# Patient Record
Sex: MALE | Race: WHITE
[De-identification: names, ages, dates, MRNs, and addresses within clinical notes are randomized per-mention and may not be internally consistent; named-entity substitution may affect disease eponyms.]

---

## 2018-03-12 ENCOUNTER — HOSPITAL ENCOUNTER (INPATIENT)
Dept: HOSPITAL 80 - FED | Age: 48
LOS: 3 days | Discharge: PSYCHIATRIC OR SUBSTANCE USE FACILITY | DRG: 885 | End: 2018-03-15
Attending: INTERNAL MEDICINE | Admitting: FAMILY MEDICINE
Payer: MEDICAID

## 2018-03-12 DIAGNOSIS — F43.10: ICD-10-CM

## 2018-03-12 DIAGNOSIS — F12.90: ICD-10-CM

## 2018-03-12 DIAGNOSIS — Z87.891: ICD-10-CM

## 2018-03-12 DIAGNOSIS — F25.0: ICD-10-CM

## 2018-03-12 DIAGNOSIS — M62.82: ICD-10-CM

## 2018-03-12 DIAGNOSIS — F23: Primary | ICD-10-CM

## 2018-03-12 LAB
CK SERPL-CCNC: (no result) IU/L (ref 0–224)
CK SERPL-CCNC: (no result) IU/L (ref 0–224)
CK SERPL-CCNC: 9970 IU/L (ref 0–224)
PLATELET # BLD: 245 10^3/UL (ref 150–400)

## 2018-03-12 PROCEDURE — G0378 HOSPITAL OBSERVATION PER HR: HCPCS

## 2018-03-12 PROCEDURE — G0480 DRUG TEST DEF 1-7 CLASSES: HCPCS

## 2018-03-12 NOTE — PDGENHP
History and Physical





- Chief Complaint


agitation, rhabdomyolysis





- History of Present Illness








Source-Patient is somnolent and sedated.  History limited to discussion with ED 

provider and review of EMR.





HPI  - this is a 47-year-old gentleman with past medical history significant 

for bipolar disorder, schizophrenia who presents emergency department with 

focal PD for severe agitation.  History is quite limited details are slim.  

Patient required chemical and physical restraints.  He has received Ativan and 

Zyprexa and has been sedated.  Patient's laboratory studies were significant 

for elevated CK of greater than 10,000.  Patient has received IV fluids and 

despite this his CK remains quite elevated.  His renal function is intact.  

Patient has been put on a M1 hold and is not quite medically cleared for 

discharge to inpatient psychiatric facility.











History Information





- Allergies/Home Medication List


Allergies/Adverse Reactions: 








haloperidol [From Haldol] Allergy (Verified 10/18/13 15:38)


 


Clozaril Allergy (Uncoded 10/18/13 16:59)


 





Home Medications: 








Divalproex ER [Depakote  MG (RX)] 1,500 mg PO DAILY16 10/17/13 [Last 

Taken Unknown]


OLANZapine DISINTEGR [ZyPREXA ZYDIS 10 mg (RX)] 25 mg PO DAILY16 10/17/13 [Last 

Taken Unknown]





I have personally reviewed and updated: family history, medical history, social 

history, surgical history





- Past Medical History


Additional medical history: Schizophrenia, bipolar disorder





- Surgical History


Additional surgical history: Unable to obtain as patient is sedated.  None 

listed in EMR.





- Family History


Additional family history: Unable to obtain secondary to patient's sedation.





- Social History


Smoking Status: Former smoker


Drug Use: Marijuana


Additional social history: Patient's U tox positive for marijuana.





Review of Systems


Review of Systems: 


Unable to obtain secondary to patient's sedation somnolence.





Physical Exam


Physical Exam: 

















Temp Pulse Resp BP Pulse Ox


 


 36.7 C   108 H  20   114/60   95 


 


 03/12/18 16:00  03/12/18 22:27  03/12/18 22:27  03/12/18 22:27  03/12/18 22:27








 Selected Entries











  03/12/18





  13:21


 


Blood Pressure Automatic





Method 


 


Heart Rate 107 H


 


Respiratory 34 H





Rate 


 


O2 Sat (%) 99


 


Temperature (C) 37.6 C


 


Blood Pressure 118/82 H


 


Mean Arterial 94





Pressure (MAP) 


 


Temperature Oral





Source 











Constitutional: no apparent distress, unkempt, other (Somnolent and sedated.  

Patient does open his eyes and says yes to just a few questions before falling 

asleep again.)


Eyes: PERRL (Pupils equal round decreased reactivity light bilaterally and 

symmetric.), anicteric sclera, No EOMI (Unable to assess.  Patient with 

voluntary lateral gaze intact.), No scleral injection


Ears, Nose, Mouth, Throat: no oral mucosal ulcers, dry mucous membranes, No 

poor dentition


Cardiovascular: pulses symmetric bilaterally, tachycardia, No systolic murmur


Peripheral Pulses: 2+: dorsalis-pedis (R), dorsalis-pedis (L)


Respiratory: no respiratory distress, no rales or rhonchi, clear to auscultation

, No expiratory wheeze


Gastrointestinal: normoactive bowel sounds, soft, non-tender abdomen, no 

palpable masses, No huerta's sign, No guarding, No distension


Genitourinary: no bladder tenderness, No pierre in urethra


Skin: warm, normal color, no rashes or abrasions


Musculoskeletal: other (Limited exam.  Patient is able to open his eyes and 

turn his head but does not move his extremities as he falls back asleep.)


Neurologic: other (No facial drooping.  Patient again is somnolent and sedated.)


Psychiatric: other (Limited exam patient is somnolent and sedated.), No agitated





Lab Data & Imaging Review





 03/12/18 13:31





 03/12/18 21:45














WBC  15.40 10^3/uL (3.80-9.50)  H  03/12/18  13:31    


 


RBC  5.00 10^6/uL (4.40-6.38)   03/12/18  13:31    


 


Hgb  15.5 g/dL (13.7-17.5)   03/12/18  13:31    


 


Hct  43.5 % (40.0-51.0)   03/12/18  13:31    


 


MCV  87.0 fL (81.5-99.8)   03/12/18  13:31    


 


MCH  31.0 pg (27.9-34.1)   03/12/18  13:31    


 


MCHC  35.6 g/dL (32.4-36.7)   03/12/18  13:31    


 


RDW  12.5 % (11.5-15.2)   03/12/18  13:31    


 


Plt Count  245 10^3/uL (150-400)   03/12/18  13:31    


 


MPV  9.7 fL (8.7-11.7)   03/12/18  13:31    


 


Neut % (Auto)  90.6 % (39.3-74.2)  H  03/12/18  13:31    


 


Lymph % (Auto)  5.0 % (15.0-45.0)  L  03/12/18  13:31    


 


Mono % (Auto)  3.7 % (4.5-13.0)  L  03/12/18  13:31    


 


Eos % (Auto)  0.0 % (0.6-7.6)  L  03/12/18  13:31    


 


Baso % (Auto)  0.2 % (0.3-1.7)  L  03/12/18  13:31    


 


Nucleat RBC Rel Count  0.0 % (0.0-0.2)   03/12/18  13:31    


 


Absolute Neuts (auto)  13.95 10^3/uL (1.70-6.50)  H  03/12/18  13:31    


 


Absolute Lymphs (auto)  0.77 10^3/uL (1.00-3.00)  L  03/12/18  13:31    


 


Absolute Monos (auto)  0.57 10^3/uL (0.30-0.80)   03/12/18  13:31    


 


Absolute Eos (auto)  0.00 10^3/uL (0.03-0.40)  L  03/12/18  13:31    


 


Absolute Basos (auto)  0.03 10^3/uL (0.02-0.10)   03/12/18  13:31    


 


Absolute Nucleated RBC  0.00 10^3/uL (0-0.01)   03/12/18  13:31    


 


Immature Gran %  0.5 % (0.0-1.1)   03/12/18  13:31    


 


Immature Gran #  0.08 10^3/uL (0.00-0.10)   03/12/18  13:31    


 


Puncture Site  RIGHT RADIAL   03/12/18  14:20    


 


Patient Temperature  37.0 DEGREES  03/12/18  14:20    


 


pCO2  24 mmHg (34-38)  L  03/12/18  14:20    


 


pO2  91 mmHg (65-75)  H  03/12/18  14:20    


 


Total CO2  12 mEq/L (23-27)  L  03/12/18  14:20    


 


ABG pH  7.29  (7.35-7.45)  L  03/12/18  14:20    


 


ABG HCO3  11 mEq/L (22-26)  L  03/12/18  14:20    


 


ABG O2 Saturation  95 % (92-95)   03/12/18  14:20    


 


ABG Base Excess  -13.8 mEq/L (-2.5-2.5)  L  03/12/18  14:20    


 


Sodium  139 mEq/L (135-145)   03/12/18  13:31    


 


Potassium  4.0 mEq/L (3.5-5.2)   03/12/18  13:31    


 


Chloride  102 mEq/L ()   03/12/18  13:31    


 


Carbon Dioxide  8 mEq/l (22-31)  L*  03/12/18  13:31    


 


Anion Gap  29 mEq/L (8-16)  H  03/12/18  13:31    


 


BUN  29 mg/dL (7-23)  H  03/12/18  13:31    


 


Creatinine  1.0 mg/dL (0.7-1.3)   03/12/18  13:31    


 


Estimated GFR  > 60   03/12/18  13:31    


 


Glucose  88 mg/dL ()   03/12/18  13:31    


 


Calcium  10.0 mg/dL (8.5-10.4)   03/12/18  13:31    


 


Creatine Kinase  48945 IU/L (0-224)  H  03/12/18  21:45    


 


CK-MB (CK-2) Fraction  36.40 ng/mL (0.00-3.19)  H  03/12/18  18:28    


 


CK-MB (CK-2) %  0.4 % (0.0-4.0)   03/12/18  18:28    


 


Creatine Kinase Interp  NEGATIVE  (NEGATIVE)   03/12/18  18:28    


 


Urine Color  YELLOW   03/12/18  20:25    


 


Urine Appearance  CLEAR   03/12/18  20:25    


 


Urine pH  5.0  (5.0-7.5)   03/12/18  20:25    


 


Ur Specific Gravity  1.026  (1.002-1.030)   03/12/18  20:25    


 


Urine Protein  1+  (NEGATIVE)  H  03/12/18  20:25    


 


Urine Ketones  2+  (NEGATIVE)  H  03/12/18  20:25    


 


Urine Blood  2+  (NEGATIVE)  H  03/12/18  20:25    


 


Urine Nitrate  NEGATIVE  (NEGATIVE)   03/12/18  20:25    


 


Urine Bilirubin  NEGATIVE  (NEGATIVE)   03/12/18  20:25    


 


Urine Urobilinogen  NEGATIVE EU (0.2-1.0)   03/12/18  20:25    


 


Ur Leukocyte Esterase  NEGATIVE  (NEGATIVE)   03/12/18  20:25    


 


Urine RBC  1-3 /hpf (0-3)   03/12/18  20:25    


 


Urine WBC  1-3 /hpf (0-3)   03/12/18  20:25    


 


Ur Epithelial Cells  TRACE /lpf (NONE-1+)   03/12/18  20:25    


 


Hyaline Casts  1-5 /lpf (0-1)   03/12/18  20:25    


 


Urine Mucus  TRACE /lpf (NONE-1+)   03/12/18  20:25    


 


Urine Glucose  1+  (NEGATIVE)  H  03/12/18  20:25    


 


Urine Opiates Screen  NEGATIVE  (NEGATIVE)   03/12/18  20:25    


 


Urine Barbiturates  NEGATIVE  (NEGATIVE)   03/12/18  20:25    


 


Ur Phencyclidine Scrn  NEGATIVE  (NEGATIVE)   03/12/18  20:25    


 


Ur Amphetamine Screen  NEGATIVE  (NEGATIVE)   03/12/18  20:25    


 


U Benzodiazepines Scrn  NEGATIVE  (NEGATIVE)   03/12/18  20:25    


 


Urine Cocaine Screen  NEGATIVE  (NEGATIVE)   03/12/18  20:25    


 


U Marijuana (THC) Screen  NON-NEGATIVE  (NEGATIVE)  H  03/12/18  20:25    


 


Ethyl Alcohol  < 10 mg/dL (0-10)   03/12/18  18:28    








Imaging Review: 








CT of the Head (Without Contrast) 


 


 Indication:    Altered mental status 


 


 Comparison:  None available 


 


 Technique:    Standard noncontrast head CT protocol utilizing 5 mm thick 

collimated slices and 


field of view 23 cm. Dose reduction techniques were utilized. 


 


 Findings:    No intracranial hemorrhage, mass, ischemia, swelling, or 

extraaxial fluid collection. 


 


 Incidentally noted prominent retrocerebellar cerebrospinal fluid with normal-

appearing fourth 


ventricle and normal are followed to the cerebellar hemispheres. 


 


 The ventricles are normal caliber and midline. The gray-white matter there is 

leftward septal 


deviation. Has normal attenuation. The bones are unremarkable. The paranasal 

sinuses are clear. The 


right frontal sinus is not pneumatized. Remote nasal bone fracture noted. 


 


 


Impression: 


1. No evidence of acute intracranial abnormality. 


2. Incidental note of yuriy-cisterna magna. Differential consideration of 

arachnoid cyst is less 


likely given symmetry. 


 


Findings and recommendations discussed with Moshe Millard  at 5:09 PM hour, 3

/12/2018. 


EKG additional interpertation: Sinus tachycardia in the 1 100s.  No acute ST 

changes.  Some slight people changes with likely related rate.  Q-wave in lead 

3 only.  .





Assessment & Plan


Assessment: 








Acute psychosis (Acute) - with history of bipolar disorder and schizophrenia.  

Zyprexa will be made available p.r.n..  If the patient should wake and is 

cooperative will plan to resume his home Depakote and Zyprexa.





Rhabdomyolysis (Acute) - cause for patient's rhabdo is unclear at this time.  

He is not able answer any questions or provide history regarding any injuries 

or falls.  There is no evidence of trauma on exam.  Will continue with IV fluid 

hydration and plan to repeat CKs.





Leukocytosis - likely reactive in setting of significant dehydration and 

psychosis.  Continue with IV fluid hydration patient is afebrile.  Repeat CBC 

in the morning.


Metabolic AG acidosis - improved after IV fluids.  Continue monitor a.m. BMP.





FEN - IV fluids continuous as tolerated.  Encourage oral hydration once patient'

s sedation is improved.  Diet advanced also on patient's mentation improved.


PPX-SCDs.  Lovenox if patient should stay additional day otherwise encourage 

mobilization.  Anticipate short hospital stay.


Cor-by default will remain full at this time.  Patient with acute psychosis and 

able answer.


Disposition-patient be admitted to ICU for M1 hold and close monitoring to 

observation status.  If patient's CK improves by a.m. The plan to transfer to 

an inpatient psychiatric facility.

## 2018-03-12 NOTE — EDPHY
H & P


Stated Complaint: pt presents with psychiatic complaint, confusion and poss 

high BS


Source: Patient


Exam Limitations: No limitations





- Personal History


Current Tetanus Diphtheria and Acellular Pertussis (TDAP): Unsure





- Medical/Surgical History


Hx Asthma: No


Hx Chronic Respiratory Disease: No


Hx Diabetes: No


Hx Cardiac Disease: No


Hx Renal Disease: No


Hx Cirrhosis: No


Hx Alcoholism: No


Hx HIV/AIDS: No


Hx Splenectomy or Spleen Trauma: No


Other PMH: bipoar, schizophrenia, PTSD





- Social History


Smoking Status: Former smoker


Time Seen by Provider: 03/12/18 13:33


HPI/ROS: 





CHIEF COMPLAINT:  Altered mental status, psychotic





HISTORY OF PRESENT ILLNESS:  The patient reportedly has a history of bipolar 

mood disorder and schizophrenia.  He is brought in by police and EMS with 

psychosis and agitation.  The patient is unable to provide any coherent history 

in the emergency department.  The patient was brought into resuscitation room 

where he required physical and chemical restraint.  In reviewing his prior 

records he has been hospitalized for inpatient psychiatric care.  He has 

presented with acute psychosis in the past.





REVIEW OF SYSTEMS:


A comprehensive 10 point review of systems is unobtainable secondary to altered 

mental status


 (Moshe Millard)





- Physical Exam


Exam: 





General Appearance:  Alert, agitated, combative


Eyes:  Pupils equal and round no pallor or injection


ENT, Mouth:  Mucous membranes moist


Respiratory:  There are no retractions, lungs are clear to auscultation


Cardiovascular:  Tachycardic


Gastrointestinal:  Abdomen is soft and nontender, no masses, bowel sounds normal


Neurological:  Grossly normal motor exam


Skin:  Warm and dry, no rashes


Musculoskeletal:  Neck is supple nontender


Extremities:  symmetrical, full range of motion


Psychiatric:  Psychotic, screaming, agitated (Moshe Millard)


Constitutional: 


 Initial Vital Signs











Temperature (C)  37.6 C   03/12/18 13:21


 


Heart Rate  107 H  03/12/18 13:21


 


Respiratory Rate  34 H  03/12/18 13:21


 


Blood Pressure  118/82 H  03/12/18 13:21


 


O2 Sat (%)  99   03/12/18 13:21








 











O2 Delivery Mode               Room Air














Allergies/Adverse Reactions: 


 





clozapine [From Clozaril] Allergy (Verified 03/13/18 11:36)


 


haloperidol [From Haldol] Allergy (Verified 10/18/13 15:38)


 








Home Medications: 














 Medication  Instructions  Recorded


 


Divalproex ER [Depakote  MG 1,500 mg PO DAILY16 10/17/13





(RX)]  


 


OLANZapine DISINTEGR [ZyPREXA 30 mg PO DAILY16 10/17/13





ZYDIS 10 mg (RX)]  














Medical Decision Making





- Diagnostics


EKG Interpretation: 





EKG:  Complete interpretation has been separately recorded in the [facility] 

archive.  Summary impression:  Sinus tachycardia, rate 103 (Moshe Millard)


ED Course/Re-evaluation: 





The patient required physical straits.  He received 10 mg of Zyprexa and 1 mg 

of Ativan for sedation.





The patient was placed on a cardiac monitor.  The patient was noted to have 

evidence of rhabdomyolysis with an elevated CPK.  Given his altered mental 

status and rhabdomyolysis he was taken for a CT scan of the head which 

demonstrates no evidence of an intracranial hemorrhage.  The patient had an IV 

established.  He received several L of normal saline.





I re-evaluated the patient at 6:00 p.m..  He is now alert and much more 

conversant.  He is no longer agitated and is asking for food.





A repeat CPK was ordered is found to be decreasing.  The patient's 

rhabdomyolysis has been medically cleared.





The patient is currently on M1 psychiatric hold secondary to his presenting 

psychosis.





We are still awaiting a urine toxicology at 8:00 p.m..





The patient will be turned over to Dr. Santana at shift change.





 (Moshe Millard)





9pm: This pt presented with extreme agitation requiring physical and chemical 

restraints.  He presented in rhabdomyolysis, received 2l IV NS and now has a 

decreasing CK.  Urine tox screen is positive for THC only.  He is currently 

calm and cooperative.  He complains of pain all over, but is unable to tell me 

where he has pain.  Given that his last CK was still over 9000, I will repeat a 

CK and give him another L of fluid.  If he continues to have a decreasing CK, 

he will be medically cleared for mental health evaluation.





1030pm: repeat CK is increasing.  Will continue IVF and admit for 

rhabdomyolysis.  He will need to go to the ICU because he is on an M1 hold.  I 

considered critical care time on this pt, but I only spent 20 minutes in caring 

for him.  The hospitalist service was consulted for admission.


 (Julee Santana)


Differential Diagnosis: 





Differential diagnosis considered includes intracranial hemorrhage, skull 

fracture, metabolic derangement, postictal state, rhabdomyolysis, dehydration, 

medication side effect, psychosis





 (Moshe Millard)





- Data Points


Laboratory Results: 


 Laboratory Results





 03/13/18 05:00 





 03/13/18 05:00 








Medications Given: 


 





Divalproex Sodium (Depakote Er)  1,500 mg PO DAILY16 JOSELYN


   Stop: 09/09/18 15:59


   Last Admin: 03/13/18 16:23 Dose:  1,500 mg


Sodium Chloride (Ns)  1,000 mls @ 150 mls/hr IV CONT JOSELYN


   Stop: 09/08/18 22:44


   Last Admin: 03/14/18 05:22 Dose:  1,000 mls


Lorazepam (Ativan Injection)  0.5 - 1 mg IVP Q8HRS PRN


   PRN Reason: Anxiety, Unable to Take PO


   Stop: 09/08/18 22:42


   Last Admin: 03/13/18 02:33 Dose:  1 mg


Olanzapine (Zyprexa Zydis)  30 mg PO DAILY16 JOSELYN


   Stop: 09/09/18 15:59


   Last Admin: 03/13/18 16:23 Dose:  30 mg





Discontinued Medications





Enoxaparin Sodium (Lovenox)  40 mg SC DAILY JOSELYN


   Stop: 09/09/18 08:59


   Last Admin: 03/13/18 10:35 Dose:  Not Given


Sodium Chloride (Ns)  1,000 mls @ 0 mls/hr IV EDNOW ONE; Wide Open


   PRN Reason: Protocol


   Stop: 03/12/18 16:36


   Last Admin: 03/12/18 17:05 Dose:  1,000 mls


Sodium Chloride (Ns)  1,000 mls @ 0 mls/hr IV EDNOW ONE; Wide Open


   PRN Reason: Protocol


   Stop: 03/12/18 16:36


   Last Admin: 03/12/18 17:05 Dose:  1,000 mls


Sodium Chloride (Ns)  1,000 mls @ 0 mls/hr IV ONCE ONE; Wide Open


   PRN Reason: Protocol


   Stop: 03/12/18 21:36


   Last Admin: 03/12/18 21:43 Dose:  1,000 mls


Sodium Chloride (Ns)  1,000 mls @ 200 mls/hr IV ONCE ONE


   PRN Reason: Protocol


   Stop: 03/13/18 03:36


   Last Admin: 03/12/18 22:50 Dose:  1,000 mls


Lorazepam (Ativan Injection)  1 mg IVP EDNOW ONE


   Stop: 03/12/18 13:36


   Last Admin: 03/12/18 13:40 Dose:  1 mg


Olanzapine (Zyprexa Im Injection)  10 mg IV EDNOW ONE


   Stop: 03/12/18 13:39


   Last Admin: 03/12/18 13:40 Dose:  5 mg








Departure





- Departure


Disposition: [facility]


Clinical Impression: 


 Acute psychosis





Rhabdomyolysis


Qualifiers:


 Rhabdomyolysis type: non-traumatic Qualified Code(s): M62.82 - Rhabdomyolysis





Condition: Fair

## 2018-03-13 LAB
CK SERPL-CCNC: 8842 IU/L (ref 0–224)
PLATELET # BLD: 168 10^3/UL (ref 150–400)

## 2018-03-13 RX ADMIN — OLANZAPINE SCH MG: 10 TABLET, ORALLY DISINTEGRATING ORAL at 16:23

## 2018-03-13 RX ADMIN — DIVALPROEX SODIUM SCH MG: 500 TABLET, EXTENDED RELEASE ORAL at 16:23

## 2018-03-13 RX ADMIN — SODIUM CHLORIDE SCH MLS: 900 INJECTION, SOLUTION INTRAVENOUS at 16:31

## 2018-03-13 RX ADMIN — SODIUM CHLORIDE SCH MLS: 900 INJECTION, SOLUTION INTRAVENOUS at 10:29

## 2018-03-13 RX ADMIN — SODIUM CHLORIDE SCH MLS: 900 INJECTION, SOLUTION INTRAVENOUS at 22:39

## 2018-03-13 RX ADMIN — SODIUM CHLORIDE SCH MLS: 900 INJECTION, SOLUTION INTRAVENOUS at 04:15

## 2018-03-13 NOTE — HOSPPROG
Hospitalist Progress Note


Assessment/Plan: 





#Acute psychosis: has been off psych meds. Negative CTH. No signs infection. M1 

hold. TLC eval once medically clear





#Acute rhabdo: making urine, normal renal function. Cont aggressive IVFs





#THC use: + utox





#Leukocytosis: stress inflammation, dehydration. Nearly resolved. Negative UA, 

CXR





#Diet: regular





#DVT: low-risk, ambulator





#Disp: requires ICU admission for M1 hold, aggressive IVFs


Subjective: "I am dying. Everything is killing me"


Objective: 


 Vital Signs











Temp Pulse Resp BP Pulse Ox


 


 36.7 C   90   18   121/60 H  96 


 


 03/12/18 16:00  03/13/18 07:22  03/13/18 07:22  03/12/18 23:40  03/13/18 07:22








 Laboratory Results





 03/13/18 05:00 





 03/13/18 05:00 





 











 03/12/18 03/13/18 03/14/18





 05:59 05:59 05:59


 


Intake Total  4230 


 


Output Total  0 


 


Balance  4230 














- Physical Exam


Constitutional: no apparent distress


Eyes: PERRL


Ears, Nose, Mouth, Throat: moist mucous membranes


Cardiovascular: regular rate and rhythym


Respiratory: no respiratory distress


Gastrointestinal: normoactive bowel sounds


Genitourinary: no bladder fullness


Skin: warm, other (some scabbed over wounds left hand)


Musculoskeletal: full muscle strength


Neurologic: AAOx3, CN II-XII Intact


Psychiatric: anxious, depressed





ICD10 Worksheet


Patient Problems: 


 Problems











Problem Status Onset


 


Acute psychosis Acute  


 


Rhabdomyolysis Acute  


 


Bipolar 1 disorder Acute  


 


Bipolar affective disorder, current episode manic with psychotic symptoms Acute

  


 


Bipolar affective disorder, current episode manic without psychotic symptoms 

Acute

## 2018-03-13 NOTE — ASMTLACE
LACE

 

Acuity / Level of             Answers:  No                                    

Care: Did the patient                                                         

have an inpatient                                                             

admission?                                                                    

Comorbidities - select        Answers:  Other                         Notes:  Psychotic break, Rhabdo


all that apply                                                                

# of Emergency department     Answers:  1-2                                   

visits in the last 6                                                          

months                                                                        

Social determinants           Answers:  History of substance                  

                                        abuse (ETOH, street                   

                                        drugs, prescription                   

                                        drugs, etc.)                          

                                        Homelessness                          

                                        (street, shelter)                     

                                        Mental health diagnosis               

                                        (anxiety, depression, pers            

                                        onality disorders, etc.)              

                                        Lack of community                     

                                        resources and/or lack of              

                                        social support (no                    

                                        pcp, lives                            

                                        alone, transportation, [place]            

                                        d)                                    

Score: 15

 

Date Signed:  03/13/2018 09:38 AM

Electronically Signed By:Aaliyah Pastor LCSW

## 2018-03-13 NOTE — ASMTCMCOM
CM Note

 

CM Note                       

Notes:

47yr old male admitted for Acute psychosis, Bipolar, Schizophrenia, Rhabdo. Patient is homeless and 


on an M1 Hold. He will be assessed by [facility] when medically stable. CM to follow.

 

Date Signed:  03/13/2018 09:26 AM

Electronically Signed By:Aaliyah Pastor LCSW

## 2018-03-14 LAB
CK SERPL-CCNC: 4372 IU/L (ref 0–224)
CK SERPL-CCNC: 4512 IU/L (ref 0–224)

## 2018-03-14 RX ADMIN — SODIUM CHLORIDE SCH MLS: 900 INJECTION, SOLUTION INTRAVENOUS at 13:01

## 2018-03-14 RX ADMIN — OLANZAPINE SCH MG: 10 TABLET, ORALLY DISINTEGRATING ORAL at 16:04

## 2018-03-14 RX ADMIN — SODIUM CHLORIDE SCH MLS: 900 INJECTION, SOLUTION INTRAVENOUS at 12:05

## 2018-03-14 RX ADMIN — SODIUM CHLORIDE SCH MLS: 900 INJECTION, SOLUTION INTRAVENOUS at 05:22

## 2018-03-14 RX ADMIN — DIVALPROEX SODIUM SCH MG: 500 TABLET, EXTENDED RELEASE ORAL at 16:03

## 2018-03-14 NOTE — PDMN
Medical Necessity


Medical necessity: change to IP; los>2mn for rhabdo, dehydration, acute 

psychosis off medication; requires M1 hold in ICU, aggressive IVF, BH eval when 

medically stable; hx schizophrenia and bipolar; per order and progress note 3/13

/18

## 2018-03-14 NOTE — HOSPPROG
Hospitalist Progress Note


Assessment/Plan: 








#Acute psychosis: has been off psych meds. Negative CTH. No signs infection. M1 

hold. TLC eval once medically clear





#Acute rhabdo: making urine, normal renal function. Cont aggressive IVFs





#THC use: + utox





#Leukocytosis: stress inflammation, dehydration. Nearly resolved. Negative UA, 

CXR





#Diet: regular





#DVT: low-risk, ambulator





#Disp: requires ICU admission for M1 hold, aggressive IVFs





Plan:





Additional IVF


Likely ready for d/c tomorrow


d/w ICU team, intensivist, nurse, during team rounds


Subjective: no new complaints. no o/n events


Objective: 


 Vital Signs











Temp Pulse Resp BP Pulse Ox


 


 37.1 C   87   14   142/87 H  98 


 


 03/14/18 12:00  03/14/18 12:00  03/14/18 12:00  03/14/18 12:00  03/14/18 12:00








 Laboratory Results





 03/14/18 07:38 





 











 03/13/18 03/14/18 03/15/18





 05:59 05:59 05:59


 


Intake Total  3960 


 


Balance  3960 














- Physical Exam


Constitutional: no apparent distress


Eyes: PERRL


Ears, Nose, Mouth, Throat: moist mucous membranes, hearing normal, ears appear 

normal


Cardiovascular: regular rate and rhythym, No JVD


Respiratory: no respiratory distress, no rales or rhonchi


Gastrointestinal: normoactive bowel sounds, soft, non-tender abdomen


Skin: warm


Lymph, Heme, Immunologic: No petechiae





ICD10 Worksheet


Patient Problems: 


 Problems











Problem Status Onset


 


Acute psychosis Acute  


 


Rhabdomyolysis Acute  


 


Bipolar 1 disorder Acute  


 


Bipolar affective disorder, current episode manic with psychotic symptoms Acute

  


 


Bipolar affective disorder, current episode manic without psychotic symptoms 

Acute

## 2018-03-15 ENCOUNTER — HOSPITAL ENCOUNTER (INPATIENT)
Dept: HOSPITAL 80 - BBEH | Age: 48
LOS: 12 days | Discharge: PSYCHIATRIC OR SUBSTANCE USE FACILITY | DRG: 885 | End: 2018-03-27
Attending: PSYCHIATRY & NEUROLOGY | Admitting: PSYCHIATRY & NEUROLOGY
Payer: MEDICAID

## 2018-03-15 VITALS
OXYGEN SATURATION: 99 % | SYSTOLIC BLOOD PRESSURE: 145 MMHG | RESPIRATION RATE: 16 BRPM | HEART RATE: 79 BPM | TEMPERATURE: 97.5 F | DIASTOLIC BLOOD PRESSURE: 88 MMHG

## 2018-03-15 DIAGNOSIS — F12.90: ICD-10-CM

## 2018-03-15 DIAGNOSIS — M62.82: ICD-10-CM

## 2018-03-15 DIAGNOSIS — F25.0: Primary | ICD-10-CM

## 2018-03-15 DIAGNOSIS — Z72.89: ICD-10-CM

## 2018-03-15 DIAGNOSIS — T43.596A: ICD-10-CM

## 2018-03-15 LAB
CK SERPL-CCNC: 2365 IU/L (ref 0–224)
CK SERPL-CCNC: 2490 IU/L (ref 0–224)
PLATELET # BLD: 185 10^3/UL (ref 150–400)

## 2018-03-15 RX ADMIN — DIVALPROEX SODIUM SCH MG: 500 TABLET, EXTENDED RELEASE ORAL at 16:01

## 2018-03-15 RX ADMIN — DIVALPROEX SODIUM SCH: 500 TABLET, EXTENDED RELEASE ORAL at 21:35

## 2018-03-15 RX ADMIN — OLANZAPINE SCH MG: 10 TABLET, ORALLY DISINTEGRATING ORAL at 16:01

## 2018-03-15 RX ADMIN — SODIUM CHLORIDE SCH MLS: 900 INJECTION, SOLUTION INTRAVENOUS at 14:13

## 2018-03-15 RX ADMIN — SODIUM CHLORIDE SCH MLS: 900 INJECTION, SOLUTION INTRAVENOUS at 07:06

## 2018-03-15 NOTE — PDDCSUM
Discharge Summary


Discharge Summary: 





47-year-old gentleman with past medical history significant for bipolar disorder

, schizophrenia who presented via the emergency department with local PD for 

severe agitation and acute Psychosis.  Please see H&P for details. 


Patient required chemical and physical restraints.  He has received Ativan and 

Zyprexa and has been sedated.  Patient's laboratory studies were significant 

for elevated CK of greater than 10,000.  Patient has been put on a M1 hold. He 

was admitted. IVF were provided. CK was monitored and has essentially trended 

toward normalization. 





His volume status is much improved. His CK has trended below concerning levels. 

He is medically ready for discharge. CK will normalize on its own. He does not 

need further IVF.


No e/o impaired renal function


Ready for psychiatrical management.





DDX:





#Acute psychosis: has been off psych meds. Negative CTH. No signs infection. M1 

hold. TLC eval once medically clear





#Acute rhabdo, resolved: making urine, normal renal function. 





#THC use: + utox





#Leukocytosis: resolved





#Diet: regular








Exam:


NAD


COMFORTABLE


RRR


CTA B


S/NT/ND


NO LE EDEMA





MEDS: SEE MED REC





TOTAL TIME SPENT ON DISCHARGE IS 37 MINS. D/W ICU TEAM.

## 2018-03-16 RX ADMIN — DIVALPROEX SODIUM SCH MG: 500 TABLET, EXTENDED RELEASE ORAL at 19:05

## 2018-03-16 RX ADMIN — DIVALPROEX SODIUM SCH MG: 500 TABLET, EXTENDED RELEASE ORAL at 11:07

## 2018-03-16 RX ADMIN — OLANZAPINE SCH MG: 10 TABLET, ORALLY DISINTEGRATING ORAL at 19:05

## 2018-03-16 NOTE — BAPA
[f rep st]



                                                  ADMISSION PSYCHIATRIC ASSESSMENT





DATE OF SERVICE:  03/16/2018



REASON FOR ADMISSION:  Patient is a 47-year-old  male with a history of schizoaffective diso
rder, bipolar type.  He presents to our service on transfer from the intensive care unit where he was
 admitted after having been brought in on an M1 hold to the emergency department due to psychiatric d
ecompensation and noted to have rhabdomyolysis.  He was irrational and disorganized speech and stated
 that he had not taken his psychotropic medicines consistently for several months.  He was on monitor
ed medications through [facility] but had not been there for some time and he te
lls me today that it had been approximately 2 months since he "took them on a regular basis".  Agustina
t states to me today that he is only in the hospital "because I can't go to the bathroom".  He will n
ot explain this, will not say whether it is urination or bowel problems or what the problem specifica
lly is.  Notes from his hospitalization on the medical floor indicates that he had this similar compl
aint, but had no other referable physical complaint.  He stated that he had not eaten in months and t
ells me that he cannot eat because of his problems going to the bathroom.  He states "I can't go to t
he bathroom, so I am just not going to eat anymore".  He was medically cleared in the ICU and transfe
rred to us for further evaluation.  I get little meaningful information out of him today, as he perse
verates about this problem with his going to the bathroom and is extremely guarded.  He repeatedly ch
ecks the door to his room, refuses to turn on the light in the room and refuses to come out of his ro
om to come to my office for an interview.  He abruptly terminates the interview stating that he is "t
oo sick to talk to you anymore" and states that he needs to lay down.  When asked if he is hearing an
y voices, he states "I hear everything all the time."  When asked if he wanted to take any psychotrop
ic medications he states that Zyprexa has been helpful in the past, but "stopped working".  He cannot
 identify any specific problems with the Zyprexa but states that he stopped it again because of his t
rouble going to the bathroom.



PAST PSYCHIATRIC HISTORY:  Significant for this past diagnosis of schizoaffective disorder.  He is an
 open client of [facility] and sees Dr. Espinal by his report.  He was previously a
t this facility in October of 2013 and July of 2013, and his diagnoses at that time were schizoaffect
meño disorder, bipolar type, and cannabis abuse.  At his last discharge in October of 2013, he was pre
scribed Depakote, Zyprexa and Ativan.



ALLERGIES:  Acetaminophen, clozapine and Haldol.



CURRENT MEDICATIONS:  Are none though he most recently apparently was taking Zyprexa 30 mg h.s. and D
epakote 1500 mg daily at 1600.



PAST MEDICAL HISTORY:  Significant for the recent rhabdomyolysis, though there are no other documente
d significant chronic health problems.



SOCIAL HISTORY:  Is largely unknown as patient does not answer any questions.  He is apparently from 
[place] and was adopted as a child.  He states he is not close to any family.  Has no friends.  He
 apparently lives alone in supportive housing in [place].  He has history of cannabis and alcohol use
, though his current status is unknown.  He denies any legal problems.



FAMILY HISTORY:  Unknown.



ADMISSION LABORATORY:  Repeat LFTs show an AST up at 129 and ALT up at 80.  Last CK was 2490 drawn on
 03/15 at 0800.  This is up from 2365, drawn at 03/15 at 0400.  I do not see a repeat of that.



MENTAL STATUS EXAMINATION:  Reveals a poorly groomed, though healthy-appearing  male.  He de
monstrates overall normal level of activity, though sits in a very guarded and close body posture.  H
e demonstrates some hypervigilance constantly looking out the window and looking out the door in the 
hallway.  He gets up several times during the interview, seemingly to check the doorway.  He insists 
that the lights be off in the room during the interview.  He does not make eye contact during the int
erview.  His speech is low in tone, slow, slightly delayed, though normal in rate and flow.  His affe
ct is blunted to flat.  His mood is described as "really bad".  His thought process is disorganized a
nd perseverative on the theme of not being able to go to the bathroom.  His thought content reveals w
hat appears to be paranoia and hypervigilance and at times he does seem to be internally preoccupied.
  When asked about his auditory hallucinations, he states that he "hears everything".  He is alert an
d oriented to person, place, time, and situation, and his sensorium is clear.  There is no evidence o
f delirium.  He does not answer any questions in regard to suicide, homicide, or violence.



IMPRESSION:  Schizoaffective disorder, bipolar type, chronic with acute exacerbation, recent medicati
on noncompliance, rhabdomyolysis, possible cannabis use disorder, severity unknown.  Possible alcohol
 use disorder, severity unknown. 



Patient is a 47-year-old  male with a history of schizoaffective disorder.  He is currently 
decompensated in the setting of medication noncompliance.  The etiology of his rhabdomyolysis is unkn
own to me and I do not see that his CK has started to go down.  He was supposedly medically cleared, 
though I am not sure what the conversation there was.  I will recheck his CK at this time just to ind
icate a positive trend and consider alternative placement if it continues to increase.



PLAN:  

1.  [facility] on [facility].

2.  __________to previous psychotropic medications.

3.  Recheck labs as above.

4.  Encourage fluids.

5.  Estimated length of stay is 7-10 days.





Job #:  [id number]

## 2018-03-17 RX ADMIN — DIVALPROEX SODIUM SCH MG: 500 TABLET, EXTENDED RELEASE ORAL at 08:25

## 2018-03-17 RX ADMIN — ALUMINUM HYDROXIDE, MAGNESIUM HYDROXIDE, DIMETHICONE PRN ML: 200; 200; 20 SUSPENSION ORAL at 07:32

## 2018-03-17 RX ADMIN — OLANZAPINE SCH MG: 10 TABLET, ORALLY DISINTEGRATING ORAL at 20:38

## 2018-03-17 RX ADMIN — MAGNESIUM HYDROXIDE PRN ML: 400 SUSPENSION ORAL at 07:42

## 2018-03-17 RX ADMIN — DIVALPROEX SODIUM SCH MG: 500 TABLET, EXTENDED RELEASE ORAL at 20:38

## 2018-03-18 ENCOUNTER — HOSPITAL ENCOUNTER (EMERGENCY)
Dept: HOSPITAL 80 - FED | Age: 48
Discharge: PSYCHIATRIC OR SUBSTANCE USE FACILITY | End: 2018-03-18
Payer: MEDICAID

## 2018-03-18 VITALS
SYSTOLIC BLOOD PRESSURE: 117 MMHG | HEART RATE: 85 BPM | DIASTOLIC BLOOD PRESSURE: 67 MMHG | TEMPERATURE: 97.9 F | OXYGEN SATURATION: 96 %

## 2018-03-18 VITALS — RESPIRATION RATE: 16 BRPM

## 2018-03-18 DIAGNOSIS — R07.9: Primary | ICD-10-CM

## 2018-03-18 LAB
INR PPP: 0.95 (ref 0.83–1.16)
PLATELET # BLD: 256 10^3/UL (ref 150–400)
PROTHROMBIN TIME: 12.9 SEC (ref 12–15)

## 2018-03-18 RX ADMIN — DIVALPROEX SODIUM SCH MG: 500 TABLET, EXTENDED RELEASE ORAL at 08:59

## 2018-03-18 RX ADMIN — ALUMINUM HYDROXIDE, MAGNESIUM HYDROXIDE, DIMETHICONE PRN ML: 200; 200; 20 SUSPENSION ORAL at 09:48

## 2018-03-18 RX ADMIN — OLANZAPINE SCH MG: 10 TABLET, ORALLY DISINTEGRATING ORAL at 20:20

## 2018-03-18 RX ADMIN — MAGNESIUM HYDROXIDE PRN ML: 400 SUSPENSION ORAL at 09:04

## 2018-03-18 RX ADMIN — DIVALPROEX SODIUM SCH MG: 500 TABLET, EXTENDED RELEASE ORAL at 20:20

## 2018-03-18 NOTE — CPEKG
Heart Rate: 94

RR Interval: 638

P-R Interval: 156

QRSD Interval: 68

QT Interval: 348

QTC Interval: 436

P Axis: 47

QRS Axis: 26

T Wave Axis: 34

EKG Severity - NORMAL ECG -

EKG Impression: SINUS RHYTHM

Electronically Signed By: Gali Mcdaniel 18-Mar-2018 22:02:49

## 2018-03-18 NOTE — SOAPPROG
SOAP Progress Note


Assessment/Plan: 


Assessment:


48yo CM with hx Schizoaffective do,  bipolar type off medications with incr 

disorganization, also hx of THC use d/o





03/17/18 15:54. 


per staff, slept 9hr. 


reported AH of "everything" but not clinically appearing to respond to int stim.





on eval, pt cooperative, full beard, casually dressed, fair/decr eye contact, 

nml psychom activity, nml/low vol speech, fairly monotonous tone, restricted/

blunted affect. Regarding SI, pt stated "I'm alive now", and why in hospital "b/

c the paperwork says I'm suicidal". not clearly endorsing any SI or thoughts to 

harm others, nor AH/VH. not appearing RIS but thought processes seem 

disorganized, with vague responses. A&Ox 3, altho thought it was Sunday. 

somatic preoccupation around constipation. was offered prn.. 


states no s/e to current psych meds, "they keep me from going crazy", and 

regarding plan for after d/c, pt stated flatly "I don't know...I'll probably go 

to Hell". 


c/o constipation but otherwise denies physical c/o and no med s/e except 

sedation





PLAN:


cont Depakote, Zyprexa. Hx of monitored meds through P but med n/c for past 

couple of months


f/u labs 3/19, check VPA, chem panel, LFTs and CK (elevated prior to admit)


cont to encourage po fluids, staff monitoring intake and pt has been eating 

meals.








Objective: 





 Vital Signs











Temp Pulse Resp BP Pulse Ox


 


 36.6 C   72   14   102/72   96 


 


 03/18/18 06:00  03/18/18 06:00  03/18/18 06:00  03/18/18 06:00  03/18/18 06:00














- Time Spent With Patient


Time Spent With Patient: 





35min





- Pending Discharge


Pending Discharge Within 24 Hours: No


Pending Discharge Within 48 Hours: No





ICD10 Worksheet


Patient Problems: 


 Problems











Problem Status Onset


 


Acute psychosis Acute  


 


Bipolar 1 disorder Acute  


 


Bipolar affective disorder, current episode manic with psychotic symptoms Acute

  


 


Bipolar affective disorder, current episode manic without psychotic symptoms 

Acute  


 


Rhabdomyolysis Acute

## 2018-03-18 NOTE — EDPHY
H & P


Time Seen by Provider: 03/18/18 15:51


HPI/ROS: 





CHIEF COMPLAINT:  Chest pain





HISTORY OF PRESENT ILLNESS:  Patient is a 47-year-old male who presents 

emergency department from [address] [place] [address].  The patient has a history of psychosis and 

schizoaffective disorder.  While in psychiatric treatment he developed 

intermittent chest pain.  This primarily occurred after eating.  He also was 

noted to have an increased heart rate of 150. He had intermittent shortness of 

breath.  He was also noted to have diaphoresis.  Patient has no previous 

cardiac history per report.  The patient does smoke.





REVIEW OF SYSTEMS:  


My complete review of systems is negative except as mentioned in the HPI.


Past Medical/Surgical History: 





Includes psychosis, schizoaffective disorder





Past surgical history:  Noncontributory





Social history:  Patient smokes


Smoking Status: Former smoker


Physical Exam: 





Vitals noted.  Heart rate 104


GENERAL:  Well-appearing, in no acute distress, alert.


HEENT:  Eyes normal to inspection, normal pharynx, no signs of dehydration.


NECK:  No thyromegaly, no lymphadenopathy, supple.


RESPIRATORY:  Clear to auscultation bilaterally, no rales, rhonchi or wheezing.


CVS:  Mild tachycardia with regular rhythm, no rubs, murmurs, or gallops.


Chest wall:  No chest wall tenderness palpation.  No rash


ABDOMEN:  Soft, nontender, nondistended, no organomegaly.


BACK:  Normal to inspection, no CVA tenderness.


SKIN:  Normal color, no rash, warm, dry.  No pallor.


EXTREMITIES:  No pedal edema, no calf tenderness, no Homans sign or cords, no 

joint swelling.


NEURO/PSYCH:  Alert and oriented x3, normal mood and affect, normal motor 

sensory exam.  No obvious cranial nerve deficit.


Constitutional: 


 Initial Vital Signs











Temperature (C)  36.7 C   03/18/18 15:55


 


Heart Rate  102 H  03/18/18 15:55


 


Respiratory Rate  18   03/18/18 15:55


 


Blood Pressure  109/78   03/18/18 15:55


 


O2 Sat (%)  96   03/18/18 15:55








 











O2 Delivery Mode               Room Air














Allergies/Adverse Reactions: 


 





acetaminophen [From Tylenol] Allergy (Verified 03/15/18 22:42)


 


clozapine [From Clozaril] Allergy (Verified 03/13/18 11:36)


 


haloperidol [From Haldol] Allergy (Verified 10/18/13 15:38)


 








Home Medications: 














 Medication  Instructions  Recorded


 


Divalproex ER [Depakote  MG 1,500 mg PO DAILY16 10/17/13





(*)]  


 


OLANZapine DISINTEGR [ZyPREXA 30 mg PO DAILY16 10/17/13





ZYDIS (*)]  














Medical Decision Making





- Diagnostics


Imaging Results: 


 Imaging Impressions





Chest X-Ray  03/18/18 16:00


Impression: Normal chest x-ray.


 








Chest/Thorax CTA  03/18/18 17:13


Impression: 


1. No evidence of pulmonary embolus using CT protocol.


2. Dominant lamellated gallstone in the gallbladder. 


 


Findings discussed with Gali Mcdaniel M.D.  at  17:56 hour, 3/18/2018.


 


 











ED Course/Re-evaluation: 





In the emergency department I met EMS on arrival.  I took report from the 

paramedic.  Initial chart was created the patient has an MR count.  This was 

loss.  This is a repeat documentation.  Patient was given aspirin 324 mg 

orally.  Laboratory studies, EKG and chest x-ray were ordered.  I discussed 

plan with the patient.  I answered all his questions.





EKG shows normal sinus rhythm, normal rate, normal axis, normal intervals.  

There are no ST or T-wave abnormalities.


EKG is normal as interpreted by me.





Patient's CBC and chemistry were unremarkable.  The patient's troponin was 

negative.  D-dimer was elevated at 1.12.  Because of the elevated D-dimer CT 

angiogram was ordered.





CT angio of the chest:  Please refer the dictated report by Dr. Henry Boyd.  

No pulmonary embolus noted.  There is a gallstone present.





I discussed the results with the patient.  I answered all his questions.  On 

recheck the patient was feeling well.  He had no chest pain.  Patient was given 

warnings prior to leaving.  He will be sent back to [address] [place] [address].








Differential Diagnosis: 





My differential includes but is not limited to ACS, acute MI, dissection, 

aneurysm, PE, bronchitis, pneumonia, pericarditis, myocarditis, dehydration





- Data Points


Laboratory Results: 


 Laboratory Results





 03/18/18 15:50 





 03/18/18 15:50 





 











  03/18/18 03/18/18 03/18/18





  15:50 15:50 15:50


 


WBC      7.59 10^3/uL 10^3/uL





     (3.80-9.50) 


 


RBC      4.87 10^6/uL 10^6/uL





     (4.40-6.38) 


 


Hgb      15.1 g/dL g/dL





     (13.7-17.5) 


 


Hct      43.7 % %





     (40.0-51.0) 


 


MCV      89.7 fL fL





     (81.5-99.8) 


 


MCH      31.0 pg pg





     (27.9-34.1) 


 


MCHC      34.6 g/dL g/dL





     (32.4-36.7) 


 


RDW      12.5 % %





     (11.5-15.2) 


 


Plt Count      256 10^3/uL 10^3/uL





     (150-400) 


 


MPV      9.4 fL fL





     (8.7-11.7) 


 


Neut % (Auto)      64.4 % %





     (39.3-74.2) 


 


Lymph % (Auto)      26.6 % %





     (15.0-45.0) 


 


Mono % (Auto)      7.6 % %





     (4.5-13.0) 


 


Eos % (Auto)      0.7 % %





     (0.6-7.6) 


 


Baso % (Auto)      0.4 % %





     (0.3-1.7) 


 


Nucleat RBC Rel Count      0.0 % %





     (0.0-0.2) 


 


Absolute Neuts (auto)      4.89 10^3/uL 10^3/uL





     (1.70-6.50) 


 


Absolute Lymphs (auto)      2.02 10^3/uL 10^3/uL





     (1.00-3.00) 


 


Absolute Monos (auto)      0.58 10^3/uL 10^3/uL





     (0.30-0.80) 


 


Absolute Eos (auto)      0.05 10^3/uL 10^3/uL





     (0.03-0.40) 


 


Absolute Basos (auto)      0.03 10^3/uL 10^3/uL





     (0.02-0.10) 


 


Absolute Nucleated RBC      0.00 10^3/uL 10^3/uL





     (0-0.01) 


 


Immature Gran %      0.3 % %





     (0.0-1.1) 


 


Immature Gran #      0.02 10^3/uL 10^3/uL





     (0.00-0.10) 


 


PT    12.9 SEC SEC  





    (12.0-15.0)  


 


INR    0.95   





    (0.83-1.16)  


 


APTT    31.8 SEC SEC  





    (23.0-38.0)  


 


D-Dimer    1.12 ug/mLFEU H ug/mLFEU  





    (0.00-0.50)  


 


Sodium  139 mEq/L mEq/L    





   (135-145)   


 


Potassium  4.7 mEq/L mEq/L    





   (3.5-5.2)   


 


Chloride  99 mEq/L mEq/L    





   ()   


 


Carbon Dioxide  25 mEq/l mEq/l    





   (22-31)   


 


Anion Gap  15 mEq/L mEq/L    





   (8-16)   


 


BUN  23 mg/dL mg/dL    





   (7-23)   


 


Creatinine  1.1 mg/dL mg/dL    





   (0.7-1.3)   


 


Estimated GFR  > 60     





    


 


Glucose  68 mg/dL L mg/dL    





   ()   


 


Calcium  9.6 mg/dL mg/dL    





   (8.5-10.4)   


 


Total Bilirubin  0.4 mg/dL mg/dL    





   (0.1-1.4)   


 


Conjugated Bilirubin  0.4 mg/dL mg/dL    





   (0.0-0.5)   


 


Unconjugated Bilirubin  0.0 mg/dL mg/dL    





   (0.0-1.1)   


 


AST  41 IU/L IU/L    





   (17-59)   


 


ALT  72 IU/L IU/L    





   (21-72)   


 


Alkaline Phosphatase  67 IU/L IU/L    





   ()   


 


Troponin I  0.020 ng/mL ng/mL    





   (0.000-0.034)   


 


NT-Pro-B Natriuret Pep  < 11 pg/mL pg/mL    





   (0-125)   


 


Total Protein  7.3 g/dL g/dL    





   (6.3-8.2)   


 


Albumin  4.6 g/dL g/dL    





   (3.5-5.0)   


 


Lipase  197 IU/L IU/L    





   ()   











Medications Given: 


 








Discontinued Medications





Aspirin (Aspirin)  324 mg PO EDNOW ONE


   Stop: 03/18/18 16:01


   Last Admin: 03/18/18 16:15 Dose:  324 mg


Sodium Chloride (Ns)  1,000 mls @ 0 mls/hr IV EDNOW ONE; Wide Open


   PRN Reason: Protocol


   Stop: 03/18/18 16:01


   Last Admin: 03/18/18 16:20 Dose:  1,000 mls








Departure





- Departure


Disposition: [facility] IP


Clinical Impression: 


Chest pain


Qualifiers:


 Chest pain type: unspecified Qualified Code(s): R07.9 - Chest pain, unspecified





Condition: Good


Instructions:  Chest Pain (ED)


Referrals: 


Patient,NotPresent [Primary Care Provider] - As per Instructions

## 2018-03-18 NOTE — SOAPPROG
SOAP Progress Note


Assessment/Plan: 


Assessment:


46yo CM with hx SZA d/o off medications with incr disorganization, psychosis, 

admitted to ICU with rhabdo, and reported not eating for months.





03/17/18 15:54. 


per staff, slept 9hr. 


reported AH of "everything" but not clinically appearing to respond to int stim.





on eval, pt cooperative, full beard, casually dressed, fair/decr eye contact, 

nml psychom activity, nml/low vol speech, fairly monotonous tone, restricted/

blunted affect. Regarding SI, pt stated "I'm alive now", and why in hospital "b/

c the paperwork says I'm suicidal". not clearly endorsing any SI or thoughts to 

harm others, nor AH/VH. not appearing RIS but thought processes seem 

disorganized, with vague responses. A&Ox 3, altho thought it was Sunday. 

somatic preoccupation around constipation. was offered prn.. 


states no s/e to current psych meds, "they keep me from going crazy", and 

regarding plan for after d/c, pt stated flatly "I don't know...I'll probably go 

to Hell". 


c/o constipation but otherwise denies physical c/o and no med s/e except 

sedation





PLAN:


cont Depakote, Zyprexa. Hx of monitored meds through P but med n/c for past 

couple of months


f/u labs 3/19, check VPA, chem panel, LFTs and CK (elevated prior to admit)


cont to encourage po fluids, staff monitoring intake and pt has been eating 

meals.








03/18/18 15:24


per staff, slept 6hr. c/o feeling "very gassy", constipated, reportedly had BM 

yesterday, chest pain he attributed to his continued constipation. 


RN gave prn Maalox, MOM, Ativan. inconsistent physical complaints, multiple 

somatic complaints, and anxious. 





On evaluation, around 12:55, pt was making his bed and came out of room for 

interview. Noted with SOB but denied feeling SOB.  Reported feeling much better 

after just had large BM, and with resolution of chest pain. Denied any cardiac 

hx except hx of HTN and was evaluated on treadmill 10+yrs ago, having been told 

just to lose weight (states he was over 300#), stop smoking and avoid salt.





Clinically, however, appeared diaphoretic, with SOB, mikal w/exertion after 

making bed. VS checked with , recheck 138 w/pulse 104/65.  Maintains he 

is actually feeling better without CP and does not feel SOB. CP was L-sided, 

sharp/stabbing but also burning. Discussed concerns about cardiac status. 





Called hospitalist  at 1:15p, who recommended push fluids (since pt w/

poor hydration recently) and if still tachy in 1hr, send to ER.


Over following hr:


Pt continued tachy altho somewhat improved with 2L po fluids, but still  

and P 100/66. Diaphoresis improved, reported feeling hot/sweaty briefly, but 

resolved. No return of CP, and improved SOB but still appearing mildly SOB, hx 

has not been consistently reliable. Reports having another BM. Does have 

intermittent nausea but not currently. 


Spoke with ED Dr. Soto who accepts pt for r/o MI and additional w/u as 

indicated. 





MSE: more engaged today, less paranoid/guarded, thoughts more linear and 

organized. attributes this to having had BM finally and perhaps also 

medications. nml speech rate/vol. good eye contact. Denied any SI/HI or any AH/

VH. 





Earlier today, discussed M-1 expiring at 13:05 and options avail incl d/c, sign 

in vol and STC. Pt agreed to sign in vol, but was told he would be placed on 

STC due to hx of psychiatric treatment noncompliance, and continued need for 

psych stabilization. Pt agreed. Informed of right to 3rd party notification (

requests parents be notified of STC), rights, and legal representation. Pt 

expressed understanding.





PLAN:


 -cont VPA, Zyprexa.  Consider alternatives to current meds, perhaps one or the 

other, due to weight issues, pt reporting hx of 300#. Will not make changes at 

this time, as today is first day pt more linear in thoughts.


-placed on STC


-encouraged fluids, and still with tachycardia, recent episode of SOB/

diaphoresis/PARKER/CP with occasional N, and altho pt focused on his constipation, 

need to r/o cardiac issues. Txf to ER for eval as w/continued tachy.








Objective: 





 Vital Signs











Temp Pulse Resp BP Pulse Ox


 


 36.7 C   138 H  13   104/65   92 


 


 03/18/18 13:55  03/18/18 13:55  03/18/18 13:55  03/18/18 13:55  03/18/18 13:55














- Time Spent With Patient


Time Spent With Patient: 





45min





- Pending Discharge


Pending Discharge Within 24 Hours: No


Pending Discharge Within 48 Hours: No





ICD10 Worksheet


Patient Problems: 


 Problems











Problem Status Onset


 


Acute psychosis Acute  


 


Bipolar 1 disorder Acute  


 


Bipolar affective disorder, current episode manic with psychotic symptoms Acute

  


 


Bipolar affective disorder, current episode manic without psychotic symptoms 

Acute  


 


Rhabdomyolysis Acute

## 2018-03-19 RX ADMIN — OLANZAPINE SCH MG: 10 TABLET, ORALLY DISINTEGRATING ORAL at 20:38

## 2018-03-19 RX ADMIN — DIVALPROEX SODIUM SCH MG: 500 TABLET, EXTENDED RELEASE ORAL at 20:38

## 2018-03-19 RX ADMIN — DIVALPROEX SODIUM SCH MG: 500 TABLET, EXTENDED RELEASE ORAL at 08:18

## 2018-03-19 NOTE — SOAPPROG
SOAP Progress Note


Assessment/Plan: 


Assessment:


























Plan:





Subjective: 





Pt seen, discussed with staff.  Up walking in halls.  Conversant and 

appropriate with me.  Discussed precipitants to decompensation and he 

identifies stress over moving/losing housing and not sleeping as primary 

issues.  He also stopped his SQL for about a week, but states this was 

inadvertent and a product of the move as well.  He continues to sleep poorly, 

but is calmer, more linear, more appropriate, less intrusive.  He is compliant 

with meds and states he wants to make proper arrangements for d/c.  His 

outpatient therapist through [facility]'s visited today and spoke with him about a 

respite bed.


Objective: 





 Vital Signs











Temp Pulse Resp BP Pulse Ox


 


 36.6 C   95   14   115/76   97 


 


 03/19/18 06:00  03/19/18 06:00  03/19/18 06:00  03/19/18 06:00  03/19/18 06:00








MSE:  Well-groomed, pleasant and coop.  Affect is euthymic, stable, approp.  

Mood is "good."  TP is generally linear, though he occasionally derails.  TC 

reveals no evidence of delusions or hallucinations.  A&Ox3.  A/C are adequate 

to interview.  No SI/HI/VI.





- Time Spent With Patient


Time Spent With Patient: 





25"





ICD10 Worksheet


Patient Problems: 


 Problems











Problem Status Onset


 


Acute psychosis Acute  


 


Bipolar 1 disorder Acute  


 


Bipolar affective disorder, current episode manic with psychotic symptoms Acute

  


 


Bipolar affective disorder, current episode manic without psychotic symptoms 

Acute  


 


Rhabdomyolysis Acute

## 2018-03-20 RX ADMIN — MAGNESIUM HYDROXIDE PRN ML: 400 SUSPENSION ORAL at 09:24

## 2018-03-20 RX ADMIN — DIVALPROEX SODIUM SCH MG: 500 TABLET, EXTENDED RELEASE ORAL at 20:07

## 2018-03-20 RX ADMIN — DIVALPROEX SODIUM SCH MG: 500 TABLET, EXTENDED RELEASE ORAL at 09:15

## 2018-03-20 RX ADMIN — OLANZAPINE SCH MG: 10 TABLET, ORALLY DISINTEGRATING ORAL at 20:08

## 2018-03-20 NOTE — SOAPPROG
SOAP Progress Note


Assessment/Plan: 


Assessment:


























Plan:





03/20/18 12:45


Schizophrenia:  Remains anxious, paranoid.  Will CCM.


Subjective: 





Pt seen, discussed with staff.  Continues to isolate in his room.  Remains 

somatically focused, primarily on his bowels.  Compliant with meds.  Offers no c

/o's.  Noted to sleep 9 hours last night, but he states he didn't sleep well.


Objective: 





 Vital Signs











Temp Pulse Resp BP Pulse Ox


 


 36.3 C   99   16   125/82 H  97 


 


 03/20/18 06:00  03/20/18 06:00  03/20/18 06:00  03/20/18 06:00  03/20/18 06:00








MSE:  Marginally groomed, coop., though guarded.  Affect is constricted, 

stable.  Mood is "bad."  TP linear, though abbreviated.  Continued somatic 

preoccupation/perseveration.  TC reveals somatic and paranoid delusions.





- Time Spent With Patient


Time Spent With Patient: 





15"





ICD10 Worksheet


Patient Problems: 


 Problems











Problem Status Onset


 


Acute psychosis Acute  


 


Bipolar 1 disorder Acute  


 


Bipolar affective disorder, current episode manic with psychotic symptoms Acute

  


 


Bipolar affective disorder, current episode manic without psychotic symptoms 

Acute  


 


Rhabdomyolysis Acute

## 2018-03-21 RX ADMIN — DIVALPROEX SODIUM SCH MG: 500 TABLET, EXTENDED RELEASE ORAL at 08:38

## 2018-03-21 RX ADMIN — DIVALPROEX SODIUM SCH MG: 500 TABLET, EXTENDED RELEASE ORAL at 20:30

## 2018-03-21 RX ADMIN — OLANZAPINE SCH MG: 10 TABLET, ORALLY DISINTEGRATING ORAL at 20:30
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
## 2018-03-22 RX ADMIN — DIVALPROEX SODIUM SCH MG: 500 TABLET, EXTENDED RELEASE ORAL at 08:38

## 2018-03-22 RX ADMIN — DIVALPROEX SODIUM SCH MG: 500 TABLET, EXTENDED RELEASE ORAL at 20:26

## 2018-03-22 RX ADMIN — OLANZAPINE SCH MG: 10 TABLET, ORALLY DISINTEGRATING ORAL at 20:26

## 2018-03-23 RX ADMIN — MAGNESIUM HYDROXIDE PRN ML: 400 SUSPENSION ORAL at 09:01

## 2018-03-23 RX ADMIN — DIVALPROEX SODIUM SCH MG: 500 TABLET, EXTENDED RELEASE ORAL at 08:56

## 2018-03-23 RX ADMIN — DIVALPROEX SODIUM SCH MG: 500 TABLET, EXTENDED RELEASE ORAL at 20:31

## 2018-03-23 RX ADMIN — OLANZAPINE SCH MG: 10 TABLET, ORALLY DISINTEGRATING ORAL at 20:31

## 2018-03-23 NOTE — SOAPPROG
SOAP Progress Note


Assessment/Plan: 


Assessment:


























Plan:





03/20/18 12:45


Schizophrenia:  Remains anxious, paranoid.  Will CCM.


03/21/18 15:06


Schizophrenia:  More conversant today though quite depressed looking.  Likely 

is Schizoaffective.  Will discuss possible use of antidepressant with Dr. Espinal.


03/23/18 15:11


Schizophrenia:  Slow improvement.  CCM.


Subjective: 





LATE ENTRY FOR 03/22/18.





Pt seen, discussed with staff. Reports feeling "messed up."  Remains focused on 

childhood and "bad things."  Continues to isolate in his room, interacting very 

little with others.


Objective: 





 Vital Signs











Temp Pulse Resp BP Pulse Ox


 


 36.3 C   89   14   105/75   95 


 


 03/23/18 06:00  03/23/18 06:00  03/23/18 06:00  03/23/18 06:00  03/23/18 06:00








MSE:  Anxious, guarded.  Affect is constricted, stable.  Mood is "bad."  TP 

abbreviated, perseverative.  TC reveals continued paranoid and somatic 

delusions.





- Time Spent With Patient


Time Spent With Patient: 





15"





ICD10 Worksheet


Patient Problems: 


 Problems











Problem Status Onset


 


Acute psychosis Acute  


 


Bipolar 1 disorder Acute  


 


Bipolar affective disorder, current episode manic with psychotic symptoms Acute

  


 


Bipolar affective disorder, current episode manic without psychotic symptoms 

Acute  


 


Rhabdomyolysis Acute

## 2018-03-24 VITALS — RESPIRATION RATE: 16 BRPM

## 2018-03-24 RX ADMIN — DIVALPROEX SODIUM SCH MG: 500 TABLET, EXTENDED RELEASE ORAL at 09:06

## 2018-03-24 RX ADMIN — DIVALPROEX SODIUM SCH MG: 500 TABLET, EXTENDED RELEASE ORAL at 20:56

## 2018-03-24 RX ADMIN — OLANZAPINE SCH MG: 10 TABLET, ORALLY DISINTEGRATING ORAL at 20:55

## 2018-03-24 NOTE — SOAPPROG
SOAP Progress Note


Assessment/Plan: 


Assessment:





Per Dr. Li's notes:


Plan:





03/20/18 12:45


Schizophrenia:  Remains anxious, paranoid.  Will CCM.


03/21/18 15:06


Schizophrenia:  More conversant today though quite depressed looking.  Likely 

is Schizoaffective.  Will discuss possible use of antidepressant with Dr. Espinal.


03/23/18 15:11


Schizophrenia:  Slow improvement.  CCM.


Subjective: 





LATE ENTRY FOR 03/22/18.





Pt seen, discussed with staff. Reports feeling "messed up."  Remains focused on 

childhood and "bad things."  Continues to isolate in his room, interacting very 

little with others.




















Plan:





03/24/18 15:34


1. CCM - no change


2. Isolates in room, refuses to participate in treatment.


3. Compliant with meds. 


Subjective: 


Met with patient, reviewed chart and d/w staff. Patient is still isolating in 

his room, doesn't attend groups and only comes into milieu for meals. Patient 

slept 8 hrs last night, denies any physical complaints and denies any psychotic 

sxs. He presents much more stable since resuming his medications on admission. 





Objective: 





 Vital Signs











Temp Pulse Resp BP Pulse Ox


 


 36.3 C   86   16   99/65 L  95 


 


 03/24/18 06:00  03/24/18 06:00  03/24/18 06:00  03/24/18 06:00  03/24/18 06:00








MSE: Affect: Constricted  Mood: "OK"  TP: Tangential  TC: Denies SI/HI, denies 

AH/VH, but still appears paranoid  Insight/Judgment: Improved





- Time Spent With Patient


Time Spent With Patient: 


20"








- Pending Discharge


Pending Discharge Within 24 Hours: No


Pending Discharge Within 48 Hours: No





ICD10 Worksheet


Patient Problems: 


 Problems











Problem Status Onset


 


Acute psychosis Acute  


 


Bipolar 1 disorder Acute  


 


Bipolar affective disorder, current episode manic with psychotic symptoms Acute

  


 


Bipolar affective disorder, current episode manic without psychotic symptoms 

Acute  


 


Rhabdomyolysis Acute

## 2018-03-25 RX ADMIN — OLANZAPINE SCH MG: 10 TABLET, ORALLY DISINTEGRATING ORAL at 20:56

## 2018-03-25 RX ADMIN — DIVALPROEX SODIUM SCH MG: 500 TABLET, EXTENDED RELEASE ORAL at 20:56

## 2018-03-25 RX ADMIN — DIVALPROEX SODIUM SCH MG: 500 TABLET, EXTENDED RELEASE ORAL at 08:34

## 2018-03-25 NOTE — SOAPPROG
SOAP Progress Note


Assessment/Plan: 


Assessment:





Per Dr. Li's notes:


Plan:





03/20/18 12:45


Schizophrenia:  Remains anxious, paranoid.  Will CCM.


03/21/18 15:06


Schizophrenia:  More conversant today though quite depressed looking.  Likely 

is Schizoaffective.  Will discuss possible use of antidepressant with Dr. Espinal.


03/23/18 15:11


Schizophrenia:  Slow improvement.  CCM.


Subjective: 





LATE ENTRY FOR 03/22/18.





Pt seen, discussed with staff. Reports feeling "messed up."  Remains focused on 

childhood and "bad things."  Continues to isolate in his room, interacting very 

little with others.




















Plan:





03/24/18 15:34


1. CCM - no change


2. Isolates in room, refuses to participate in treatment.


3. Compliant with meds. 





03/25/18 14:59


1. CCM - no change 


2. Taking meds, stable


Subjective: 


Met with patient, reviewed chart and d/w staff. Patient told CC that he's not 

sure he can go back to his apartment. He said he found a table near [facility] 

and took it back to his apartment. It's not clear if he has been evicted b/c of 

this or not. He c/o dry mouth d/t meds. He says this is the reason he usually 

stops taking his meds at home. MD and RN both encouraged patient to drink 

plenty of water. 





Objective: 





 Vital Signs











Temp Pulse Resp BP Pulse Ox


 


 36.3 C   93   16   116/69   96 


 


 03/24/18 06:00  03/25/18 06:00  03/25/18 06:00  03/25/18 06:00  03/25/18 06:00








MSE: Affect: Flat  Mood: "OK"  TP: Tangential  TC: Denies any SI/HI, no 

evidence of hallucinations or RIS  Insight/Judgment: Improved





- Time Spent With Patient


Time Spent With Patient: 


15"








- Pending Discharge


Pending Discharge Within 24 Hours: No


Pending Discharge Within 48 Hours: No





ICD10 Worksheet


Patient Problems: 


 Problems











Problem Status Onset


 


Acute psychosis Acute  


 


Bipolar 1 disorder Acute  


 


Bipolar affective disorder, current episode manic with psychotic symptoms Acute

  


 


Bipolar affective disorder, current episode manic without psychotic symptoms 

Acute  


 


Rhabdomyolysis Acute

## 2018-03-26 VITALS
TEMPERATURE: 97.8 F | DIASTOLIC BLOOD PRESSURE: 55 MMHG | OXYGEN SATURATION: 98 % | HEART RATE: 89 BPM | SYSTOLIC BLOOD PRESSURE: 105 MMHG

## 2018-03-26 RX ADMIN — DIVALPROEX SODIUM SCH MG: 500 TABLET, EXTENDED RELEASE ORAL at 21:03

## 2018-03-26 RX ADMIN — OLANZAPINE SCH MG: 10 TABLET, ORALLY DISINTEGRATING ORAL at 21:03

## 2018-03-26 RX ADMIN — DIVALPROEX SODIUM SCH MG: 500 TABLET, EXTENDED RELEASE ORAL at 08:15

## 2018-03-26 NOTE — SOAPPROG
SOAP Progress Note


Assessment/Plan: 


Assessment:


























Plan:





03/20/18 12:45


Schizophrenia:  Remains anxious, paranoid.  Will CCM.


03/21/18 15:06


Schizophrenia:  More conversant today though quite depressed looking.  Likely 

is Schizoaffective.  Will discuss possible use of antidepressant with Dr. Espinal.


03/23/18 15:11


Schizophrenia:  Slow improvement.  CCM.


03/26/18 15:52


Schizophrenia:  Continued slow improvement.  CCM.


Subjective: 





Pt seen, discussed with staff, chart reviewed, discussed with Dr. Mahmood.  

Continues to isolate in his room, rarely coming out.  Paranoid, struggling to 

make d/c plans.  Lot of negative and persecutory thoughts.  Compliant with 

meds.  No behavioral issues noted.


Objective: 





 Vital Signs











Temp Pulse Resp BP Pulse Ox


 


 36.6 C   89   16   105/55 L  98 


 


 03/26/18 06:00  03/26/18 06:00  03/26/18 06:00  03/26/18 06:00  03/26/18 06:00














- Time Spent With Patient


Time Spent With Patient: 





15"





ICD10 Worksheet


Patient Problems: 


 Problems











Problem Status Onset


 


Acute psychosis Acute  


 


Bipolar 1 disorder Acute  


 


Bipolar affective disorder, current episode manic with psychotic symptoms Acute

  


 


Bipolar affective disorder, current episode manic without psychotic symptoms 

Acute  


 


Rhabdomyolysis Acute

## 2018-03-27 RX ADMIN — DIVALPROEX SODIUM SCH MG: 500 TABLET, EXTENDED RELEASE ORAL at 08:35

## 2018-03-27 NOTE — BDS
[f rep st]



                                                  BEHAVIORAL HEALTH DISCHARGE SUMMARY





REASON FOR ADMISSION:  Patient is a 47-year-old  male with a history of schizoaffective diso
rder.  He was brought to the [address] [place] [address] unit on transfer from the intensive care unit where he was treat
ed for some mild rhabdomyolysis.  He had been increasingly disorganized and agitated, and had been re
ferred to the hospital due to his level of symptomology, in the setting of medication noncompliance. 
 Once medically cleared, he was transferred to our unit for further stabilization.  A full descriptio
n of the events preceding admission can be found in his admission history dated 03/16/2018.



ADMITTING DIAGNOSES:  Schizoaffective disorder, bipolar type, chronic, with acute exacerbation; recen
t medication noncompliance; rhabdomyolysis; possible cannabis use disorder, severity unknown; possibl
e alcohol use disorder, severity unknown.



ADMISSION PHYSICAL EXAMINATION:  Not performed due to having recently been transferred from the ICU.



ADMISSION LABORATORY:  Additional labs drawn were repeat LFTs on 03/15/2018, that showed an AST up at
 129, ALT up at 82, otherwise normal.



HOSPITAL COURSE:  Patient was admitted to behavior health services inpatient unit on an M1 hold.  He 
was pleasant and cooperative, though very guarded.  He preferred to sit in his room with the lights o
ff and was almost noncommunicative.  He stated throughout his admission that he was having a high lev
el of auditory hallucinations and, when he arrived, he was very preoccupied by this.  He was also wilda
y sullen, stating that he felt sad and was noted to be tearful on several occasions.  Restarted on hi
s previous outpatient medications, including Depakote and Zyprexa, and he tolerated these well.  He w
as compliant with medicines throughout his stay. The patient's hospitalization was uncomplicated.  He
 took his medications and gradually improved.  The intensity and intrusiveness of his auditory halluc
inations improved, and his affect softened.  I did discuss the case with Dr. Yimi Espinal, the patient
's previous outpatient psychiatrist.  Dr. Espinal stated that the patient had been coming in for superv
ised medications but had not actually had an appointment with him for over 2 years.  He states that t
his was typical for the patient to avoid that.  He does report, however, that he was stable on the co
mbination of Depakote and Zyprexa.  I asked if perhaps he was appropriate for an antidepressant, and 
Dr. Espinal stated he preferred to continue his current medications as they are.  The patient's mood di
d brighten through the course of his stay. 



The patient was followed by the [facility] liaison who reported to us a plan for the michelle
ent to go to [facility].  He was somewhat ambivalent about this, but seemingly because he believed 
that he was not allowed back there.  He was given multiple reassurances that he was allowed back ther
 and seemed to be willing to go at the time of discharge.  He was agreeable also because he believed
 that he had surrendered his apartment and "given the keys back," though the care coordinator contact
ed the manager of the apartment complex and he apparently did no such thing and still has his apartme
nt, but he has trouble believing this.



CONDITION ON DISCHARGE:  Stable.  He remains paranoid and continues to have some level of auditory ha
llucinations, though appears much better and is able to interact appropriately with others.  He is ha
ving no thoughts of suicide, homicide, or violence.



DISCHARGE MEDICATIONS:  Include Depakote 500 mg daily and 1000 mg at h.s., and Zyprexa 30 mg at [facility].



DISPOSITION:  Patient is to leave the hospital with [facility] staff to go to [facility].



FOLLOWUP:  With Dr. Zuluaga at [facility]. 



The patient is to receive discharge instructions and followup appointment times at the time of discha
e. 



The patient was placed on a short-term certification with the expiration of his M1 hold.  Short-term 
certification will be terminated at time of his discharge. 



The patient's attitude toward discharge was guarded and somewhat suspicious. 



The patient did not have advance directive on file, though was a full code throughout his stay. 



There were no pending labs or studies at the time of his discharge.





Job #:  [id number]